# Patient Record
Sex: FEMALE | Race: ASIAN | NOT HISPANIC OR LATINO
[De-identification: names, ages, dates, MRNs, and addresses within clinical notes are randomized per-mention and may not be internally consistent; named-entity substitution may affect disease eponyms.]

---

## 2017-06-22 ENCOUNTER — FORM ENCOUNTER (OUTPATIENT)
Age: 42
End: 2017-06-22

## 2018-12-23 ENCOUNTER — FORM ENCOUNTER (OUTPATIENT)
Age: 43
End: 2018-12-23

## 2018-12-27 ENCOUNTER — FORM ENCOUNTER (OUTPATIENT)
Age: 43
End: 2018-12-27

## 2019-05-12 ENCOUNTER — FORM ENCOUNTER (OUTPATIENT)
Age: 44
End: 2019-05-12

## 2020-01-27 ENCOUNTER — FORM ENCOUNTER (OUTPATIENT)
Age: 45
End: 2020-01-27

## 2020-10-12 ENCOUNTER — FORM ENCOUNTER (OUTPATIENT)
Age: 45
End: 2020-10-12

## 2020-10-18 ENCOUNTER — FORM ENCOUNTER (OUTPATIENT)
Age: 45
End: 2020-10-18

## 2021-01-14 DIAGNOSIS — M32.9 SYSTEMIC LUPUS ERYTHEMATOSUS, UNSPECIFIED: ICD-10-CM

## 2021-01-14 DIAGNOSIS — Z51.89 ENCOUNTER FOR OTHER SPECIFIED AFTERCARE: ICD-10-CM

## 2021-01-14 RX ORDER — CHROMIUM 200 MCG
TABLET ORAL
Refills: 0 | Status: ACTIVE | COMMUNITY

## 2021-01-14 RX ORDER — OMEGA-3/DHA/EPA/FISH OIL 300-1000MG
CAPSULE ORAL
Refills: 0 | Status: ACTIVE | COMMUNITY

## 2021-01-28 ENCOUNTER — APPOINTMENT (OUTPATIENT)
Dept: BREAST CENTER | Facility: CLINIC | Age: 46
End: 2021-01-28
Payer: COMMERCIAL

## 2021-01-28 VITALS
WEIGHT: 124 LBS | SYSTOLIC BLOOD PRESSURE: 124 MMHG | BODY MASS INDEX: 19.93 KG/M2 | HEIGHT: 66 IN | HEART RATE: 76 BPM | DIASTOLIC BLOOD PRESSURE: 81 MMHG

## 2021-01-28 DIAGNOSIS — Z00.00 ENCOUNTER FOR GENERAL ADULT MEDICAL EXAMINATION W/OUT ABNORMAL FINDINGS: ICD-10-CM

## 2021-01-28 PROCEDURE — 99072 ADDL SUPL MATRL&STAF TM PHE: CPT

## 2021-01-28 PROCEDURE — 99213 OFFICE O/P EST LOW 20 MIN: CPT

## 2022-08-03 ENCOUNTER — APPOINTMENT (OUTPATIENT)
Dept: BREAST CENTER | Facility: CLINIC | Age: 47
End: 2022-08-03

## 2022-08-03 VITALS
HEART RATE: 78 BPM | BODY MASS INDEX: 19.94 KG/M2 | DIASTOLIC BLOOD PRESSURE: 79 MMHG | SYSTOLIC BLOOD PRESSURE: 113 MMHG | WEIGHT: 118.25 LBS | HEIGHT: 64.5 IN

## 2022-08-03 DIAGNOSIS — M35.00 SICCA SYNDROME, UNSPECIFIED: ICD-10-CM

## 2022-08-03 DIAGNOSIS — Z78.9 OTHER SPECIFIED HEALTH STATUS: ICD-10-CM

## 2022-08-03 PROCEDURE — 99213 OFFICE O/P EST LOW 20 MIN: CPT

## 2022-08-03 RX ORDER — LACTOBACILLUS ACIDOPHILUS/PECT 30 MG-20MG
TABLET ORAL
Refills: 0 | Status: ACTIVE | COMMUNITY

## 2022-08-03 RX ORDER — AMITRIPTYLINE HYDROCHLORIDE 10 MG/1
10 TABLET, FILM COATED ORAL
Qty: 78 | Refills: 0 | Status: ACTIVE | COMMUNITY
Start: 2022-06-07

## 2022-08-03 RX ORDER — MENTHOL 5.8 MG/1
LOZENGE ORAL
Refills: 0 | Status: ACTIVE | COMMUNITY

## 2022-08-03 RX ORDER — CALCIUM CARBONATE/VITAMIN D3 600 MG-10
TABLET ORAL
Refills: 0 | Status: DISCONTINUED | COMMUNITY
End: 2022-08-03

## 2022-09-21 ENCOUNTER — NON-APPOINTMENT (OUTPATIENT)
Age: 47
End: 2022-09-21

## 2022-09-21 ENCOUNTER — APPOINTMENT (OUTPATIENT)
Dept: HEART AND VASCULAR | Facility: CLINIC | Age: 47
End: 2022-09-21

## 2022-09-21 VITALS
DIASTOLIC BLOOD PRESSURE: 67 MMHG | HEIGHT: 66 IN | TEMPERATURE: 97.3 F | WEIGHT: 119 LBS | SYSTOLIC BLOOD PRESSURE: 118 MMHG | HEART RATE: 81 BPM | BODY MASS INDEX: 19.13 KG/M2

## 2022-09-21 PROCEDURE — 99204 OFFICE O/P NEW MOD 45 MIN: CPT | Mod: 25

## 2022-09-21 PROCEDURE — 93000 ELECTROCARDIOGRAM COMPLETE: CPT
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
## 2023-03-15 ENCOUNTER — APPOINTMENT (OUTPATIENT)
Dept: BREAST CENTER | Facility: CLINIC | Age: 48
End: 2023-03-15
Payer: COMMERCIAL

## 2023-03-15 VITALS
WEIGHT: 112 LBS | HEIGHT: 66 IN | SYSTOLIC BLOOD PRESSURE: 117 MMHG | HEART RATE: 80 BPM | DIASTOLIC BLOOD PRESSURE: 76 MMHG | BODY MASS INDEX: 18 KG/M2

## 2023-03-15 DIAGNOSIS — Z78.9 OTHER SPECIFIED HEALTH STATUS: ICD-10-CM

## 2023-03-15 PROCEDURE — 99213 OFFICE O/P EST LOW 20 MIN: CPT

## 2023-03-16 NOTE — HISTORY OF PRESENT ILLNESS
[FreeTextEntry1] : Patient is a 48yo F who presents today for breast cancer screening. She has hx of L breast pain. Denies family history of breast or ovarian cancer. Patient denies palpable masses, skin changes, or nipple discharge bilaterally.\par \par 6/23/17: B/l MG- JUDY\par 12/28/18: B/l MG & US- no evidence of disease\par 1/20/20: B/l MG & US- dense\par 10/13/20: L MG & US- JUDY.\par 01/28/21: B/l MG & US - Extremely dense. BIRADS 1.\par 2/11/22: B/L MG & US- Dense. MG- WNL. US- R 0.7cm 10:00, 7FN benign intramammary LN. BIRADS 2\par 3/15/23: B/l MG & US- heterogenously dense. US- R 0.7cm stable LN 10:00 7FN. BI-RADS 2

## 2023-03-16 NOTE — PAST MEDICAL HISTORY
[Menarche Age ____] : age at menarche was [unfilled] [Approximately ___] : the LMP was approximately [unfilled] [Total Preg ___] : G[unfilled] [AB Spont ___] : miscarriages: [unfilled]  [Definite ___ (Date)] : the last menstrual period was [unfilled] [Regular Cycle Intervals] : have been regular [History of Hormone Replacement Treatment] : has no history of hormone replacement treatment [FreeTextEntry5] : no [FreeTextEntry6] : YES [FreeTextEntry7] : NO [FreeTextEntry8] : NO

## 2023-03-16 NOTE — PHYSICAL EXAM
[Normocephalic] : normocephalic [EOMI] : extra ocular movement intact [Supple] : supple [No Supraclavicular Adenopathy] : no supraclavicular adenopathy [No Cervical Adenopathy] : no cervical adenopathy [de-identified] : Bilateral breast/axilla/supraclavicular area: No masses, discharge, or adenopathy\par

## 2024-01-02 ENCOUNTER — NEW REFERRAL (OUTPATIENT)
Dept: URBAN - METROPOLITAN AREA CLINIC 92 | Facility: CLINIC | Age: 49
End: 2024-01-02

## 2024-01-02 DIAGNOSIS — H16.223: ICD-10-CM

## 2024-01-02 DIAGNOSIS — H02.836: ICD-10-CM

## 2024-01-02 DIAGNOSIS — H02.833: ICD-10-CM

## 2024-01-02 DIAGNOSIS — H43.811: ICD-10-CM

## 2024-01-02 DIAGNOSIS — H30.91: ICD-10-CM

## 2024-01-02 PROCEDURE — 92250 FUNDUS PHOTOGRAPHY W/I&R: CPT

## 2024-01-02 PROCEDURE — 92004 COMPRE OPH EXAM NEW PT 1/>: CPT

## 2024-01-02 ASSESSMENT — KERATOMETRY
OS_AXISANGLE2_DEGREES: 90
OD_AXISANGLE2_DEGREES: 80
OS_AXISANGLE_DEGREES: 180
OD_K1POWER_DIOPTERS: 40.50
OD_AXISANGLE_DEGREES: 170
OS_K2POWER_DIOPTERS: 42.75
OS_K1POWER_DIOPTERS: 41.00
OD_K2POWER_DIOPTERS: 42.00

## 2024-01-02 ASSESSMENT — VISUAL ACUITY
OS_CC: 20/25
OD_CC: 20/25

## 2024-01-02 ASSESSMENT — TONOMETRY
OD_IOP_MMHG: 12
OS_IOP_MMHG: 12

## 2024-03-27 ENCOUNTER — APPOINTMENT (OUTPATIENT)
Dept: BREAST CENTER | Facility: CLINIC | Age: 49
End: 2024-03-27
Payer: COMMERCIAL

## 2024-03-27 VITALS
DIASTOLIC BLOOD PRESSURE: 73 MMHG | SYSTOLIC BLOOD PRESSURE: 110 MMHG | HEART RATE: 77 BPM | WEIGHT: 116 LBS | HEIGHT: 66 IN | BODY MASS INDEX: 18.64 KG/M2

## 2024-03-27 DIAGNOSIS — R92.30 DENSE BREASTS, UNSPECIFIED: ICD-10-CM

## 2024-03-27 DIAGNOSIS — N64.4 MASTODYNIA: ICD-10-CM

## 2024-03-27 PROCEDURE — 99213 OFFICE O/P EST LOW 20 MIN: CPT

## 2024-03-27 NOTE — HISTORY OF PRESENT ILLNESS
[FreeTextEntry1] : Patient is a 47yo F who presents today for breast cancer screening. She has hx of L breast pain. Denies family history of breast or ovarian cancer. Patient denies palpable masses, skin changes, or nipple discharge bilaterally.  6/23/17: B/l MG- JUDY 12/28/18: B/l MG & US- no evidence of disease 1/20/20: B/l MG & US- dense 10/13/20: L MG & US- JUDY. 01/28/21: B/l MG & US - Extremely dense. BIRADS 1. 2/11/22: B/L MG & US- Dense. MG- WNL. US- R 0.7cm 10:00, 7FN benign intramammary LN. BIRADS 2 3/15/23: B/l MG & US- heterogeneously dense. US- R 0.7cm stable LN 10:00 7FN. BI-RADS 2 3/27/24: B/l MG & US- extremely dense. Benign findings. BI-RADS 2.

## 2024-03-27 NOTE — PAST MEDICAL HISTORY
[Menarche Age ____] : age at menarche was [unfilled] [Definite ___ (Date)] : the last menstrual period was [unfilled] [Approximately ___] : the LMP was approximately [unfilled] [Regular Cycle Intervals] : have been regular [Total Preg ___] : G[unfilled] [AB Spont ___] : miscarriages: [unfilled]  [FreeTextEntry5] : no [History of Hormone Replacement Treatment] : has no history of hormone replacement treatment [FreeTextEntry6] : YES [FreeTextEntry7] : NO [FreeTextEntry8] : NO